# Patient Record
Sex: MALE | Race: BLACK OR AFRICAN AMERICAN | Employment: OTHER | URBAN - METROPOLITAN AREA
[De-identification: names, ages, dates, MRNs, and addresses within clinical notes are randomized per-mention and may not be internally consistent; named-entity substitution may affect disease eponyms.]

---

## 2019-04-06 ENCOUNTER — HOSPITAL ENCOUNTER (OUTPATIENT)
Age: 54
Discharge: HOME | End: 2019-04-06
Payer: MEDICAID

## 2019-04-06 LAB
ALBUMIN SERPL-MCNC: 4 G/DL (ref 3.5–5.2)
ALP BLD-CCNC: 69 U/L (ref 40–129)
ALT SERPL-CCNC: 24 U/L (ref 0–40)
ANION GAP SERPL CALCULATED.3IONS-SCNC: 9 MMOL/L (ref 7–16)
AST SERPL-CCNC: 17 U/L (ref 0–39)
BASOPHILS ABSOLUTE: 0.03 E9/L (ref 0–0.2)
BASOPHILS RELATIVE PERCENT: 0.5 % (ref 0–2)
BILIRUB SERPL-MCNC: 0.5 MG/DL (ref 0–1.2)
BILIRUBIN DIRECT: <0.2 MG/DL (ref 0–0.3)
BILIRUBIN, INDIRECT: NORMAL MG/DL (ref 0–1)
BUN BLDV-MCNC: 19 MG/DL (ref 6–20)
CALCIUM SERPL-MCNC: 8.9 MG/DL (ref 8.6–10.2)
CHLORIDE BLD-SCNC: 104 MMOL/L (ref 98–107)
CO2: 26 MMOL/L (ref 22–29)
CREAT SERPL-MCNC: 1 MG/DL (ref 0.7–1.2)
EOSINOPHILS ABSOLUTE: 0.06 E9/L (ref 0.05–0.5)
EOSINOPHILS RELATIVE PERCENT: 1.1 % (ref 0–6)
GFR AFRICAN AMERICAN: >60
GFR NON-AFRICAN AMERICAN: >60 ML/MIN/1.73
GLUCOSE BLD-MCNC: 145 MG/DL (ref 74–99)
HBA1C MFR BLD: 6.3 % (ref 4–5.6)
HCT VFR BLD CALC: 42 % (ref 37–54)
HEMOGLOBIN: 13.3 G/DL (ref 12.5–16.5)
IMMATURE GRANULOCYTES #: 0.01 E9/L
IMMATURE GRANULOCYTES %: 0.2 % (ref 0–5)
LYMPHOCYTES ABSOLUTE: 2.23 E9/L (ref 1.5–4)
LYMPHOCYTES RELATIVE PERCENT: 40.3 % (ref 20–42)
MCH RBC QN AUTO: 30.8 PG (ref 26–35)
MCHC RBC AUTO-ENTMCNC: 31.7 % (ref 32–34.5)
MCV RBC AUTO: 97.2 FL (ref 80–99.9)
MONOCYTES ABSOLUTE: 0.46 E9/L (ref 0.1–0.95)
MONOCYTES RELATIVE PERCENT: 8.3 % (ref 2–12)
NEUTROPHILS ABSOLUTE: 2.74 E9/L (ref 1.8–7.3)
NEUTROPHILS RELATIVE PERCENT: 49.6 % (ref 43–80)
PDW BLD-RTO: 13 FL (ref 11.5–15)
PLATELET # BLD: 281 E9/L (ref 130–450)
PMV BLD AUTO: 9.8 FL (ref 7–12)
POTASSIUM SERPL-SCNC: 4 MMOL/L (ref 3.5–5)
RBC # BLD: 4.32 E12/L (ref 3.8–5.8)
SODIUM BLD-SCNC: 139 MMOL/L (ref 132–146)
TOTAL PROTEIN: 7 G/DL (ref 6.4–8.3)
WBC # BLD: 5.5 E9/L (ref 4.5–11.5)

## 2019-04-06 PROCEDURE — 83036 HEMOGLOBIN GLYCOSYLATED A1C: CPT

## 2019-04-06 PROCEDURE — 80076 HEPATIC FUNCTION PANEL: CPT

## 2019-04-06 PROCEDURE — 36415 COLL VENOUS BLD VENIPUNCTURE: CPT

## 2019-04-06 PROCEDURE — 80048 BASIC METABOLIC PNL TOTAL CA: CPT

## 2019-04-06 PROCEDURE — 85025 COMPLETE CBC W/AUTO DIFF WBC: CPT

## 2020-11-23 ENCOUNTER — APPOINTMENT (OUTPATIENT)
Dept: CT IMAGING | Age: 55
End: 2020-11-23
Payer: COMMERCIAL

## 2020-11-23 ENCOUNTER — HOSPITAL ENCOUNTER (EMERGENCY)
Age: 55
Discharge: HOME | End: 2020-11-23
Attending: EMERGENCY MEDICINE
Payer: COMMERCIAL

## 2020-11-23 VITALS
TEMPERATURE: 98.3 F | SYSTOLIC BLOOD PRESSURE: 176 MMHG | DIASTOLIC BLOOD PRESSURE: 90 MMHG | WEIGHT: 188.7 LBS | HEART RATE: 80 BPM | OXYGEN SATURATION: 98 % | RESPIRATION RATE: 18 BRPM

## 2020-11-23 LAB
ALBUMIN SERPL-MCNC: 3.5 G/DL (ref 3.5–5.2)
ALP BLD-CCNC: 82 U/L (ref 40–129)
ALT SERPL-CCNC: 17 U/L (ref 0–40)
ANION GAP SERPL CALCULATED.3IONS-SCNC: 10 MMOL/L (ref 7–16)
AST SERPL-CCNC: 16 U/L (ref 0–39)
BASOPHILS ABSOLUTE: 0.03 E9/L (ref 0–0.2)
BASOPHILS RELATIVE PERCENT: 0.4 % (ref 0–2)
BILIRUB SERPL-MCNC: 0.3 MG/DL (ref 0–1.2)
BUN BLDV-MCNC: 19 MG/DL (ref 6–20)
CALCIUM SERPL-MCNC: 9.3 MG/DL (ref 8.6–10.2)
CHLORIDE BLD-SCNC: 105 MMOL/L (ref 98–107)
CO2: 25 MMOL/L (ref 22–29)
CREAT SERPL-MCNC: 1.1 MG/DL (ref 0.7–1.2)
EOSINOPHILS ABSOLUTE: 0.07 E9/L (ref 0.05–0.5)
EOSINOPHILS RELATIVE PERCENT: 0.8 % (ref 0–6)
GFR AFRICAN AMERICAN: >60
GFR NON-AFRICAN AMERICAN: >60 ML/MIN/1.73
GLUCOSE BLD-MCNC: 145 MG/DL (ref 74–99)
HCT VFR BLD CALC: 41.1 % (ref 37–54)
HEMOGLOBIN: 12.8 G/DL (ref 12.5–16.5)
IMMATURE GRANULOCYTES #: 0.02 E9/L
IMMATURE GRANULOCYTES %: 0.2 % (ref 0–5)
LIPASE: 20 U/L (ref 13–60)
LYMPHOCYTES ABSOLUTE: 2.1 E9/L (ref 1.5–4)
LYMPHOCYTES RELATIVE PERCENT: 25 % (ref 20–42)
MCH RBC QN AUTO: 30.3 PG (ref 26–35)
MCHC RBC AUTO-ENTMCNC: 31.1 % (ref 32–34.5)
MCV RBC AUTO: 97.2 FL (ref 80–99.9)
MONOCYTES ABSOLUTE: 0.82 E9/L (ref 0.1–0.95)
MONOCYTES RELATIVE PERCENT: 9.8 % (ref 2–12)
NEUTROPHILS ABSOLUTE: 5.37 E9/L (ref 1.8–7.3)
NEUTROPHILS RELATIVE PERCENT: 63.8 % (ref 43–80)
PDW BLD-RTO: 12.3 FL (ref 11.5–15)
PLATELET # BLD: 342 E9/L (ref 130–450)
PMV BLD AUTO: 9.2 FL (ref 7–12)
POTASSIUM REFLEX MAGNESIUM: 3.6 MMOL/L (ref 3.5–5)
RBC # BLD: 4.23 E12/L (ref 3.8–5.8)
SODIUM BLD-SCNC: 140 MMOL/L (ref 132–146)
TOTAL PROTEIN: 7.8 G/DL (ref 6.4–8.3)
WBC # BLD: 8.4 E9/L (ref 4.5–11.5)

## 2020-11-23 PROCEDURE — 6360000004 HC RX CONTRAST MEDICATION: Performed by: RADIOLOGY

## 2020-11-23 PROCEDURE — 2580000003 HC RX 258: Performed by: RADIOLOGY

## 2020-11-23 PROCEDURE — 96374 THER/PROPH/DIAG INJ IV PUSH: CPT

## 2020-11-23 PROCEDURE — 6360000002 HC RX W HCPCS: Performed by: EMERGENCY MEDICINE

## 2020-11-23 PROCEDURE — 85025 COMPLETE CBC W/AUTO DIFF WBC: CPT

## 2020-11-23 PROCEDURE — 99283 EMERGENCY DEPT VISIT LOW MDM: CPT

## 2020-11-23 PROCEDURE — 83690 ASSAY OF LIPASE: CPT

## 2020-11-23 PROCEDURE — 2580000003 HC RX 258: Performed by: EMERGENCY MEDICINE

## 2020-11-23 PROCEDURE — 74177 CT ABD & PELVIS W/CONTRAST: CPT

## 2020-11-23 PROCEDURE — 80053 COMPREHEN METABOLIC PANEL: CPT

## 2020-11-23 RX ORDER — SODIUM CHLORIDE 0.9 % (FLUSH) 0.9 %
10 SYRINGE (ML) INJECTION PRN
Status: COMPLETED | OUTPATIENT
Start: 2020-11-23 | End: 2020-11-23

## 2020-11-23 RX ORDER — ONDANSETRON 4 MG/1
4 TABLET, ORALLY DISINTEGRATING ORAL EVERY 8 HOURS PRN
Qty: 24 TABLET | Refills: 0 | Status: SHIPPED | OUTPATIENT
Start: 2020-11-23

## 2020-11-23 RX ORDER — OMEPRAZOLE 20 MG/1
20 CAPSULE, DELAYED RELEASE ORAL EVERY 12 HOURS
Qty: 28 CAPSULE | Refills: 0 | Status: SHIPPED | OUTPATIENT
Start: 2020-11-23 | End: 2020-12-07

## 2020-11-23 RX ORDER — KETOROLAC TROMETHAMINE 30 MG/ML
15 INJECTION, SOLUTION INTRAMUSCULAR; INTRAVENOUS ONCE
Status: COMPLETED | OUTPATIENT
Start: 2020-11-23 | End: 2020-11-23

## 2020-11-23 RX ORDER — DICYCLOMINE HYDROCHLORIDE 10 MG/1
20 CAPSULE ORAL 3 TIMES DAILY PRN
Qty: 20 CAPSULE | Refills: 0 | Status: SHIPPED | OUTPATIENT
Start: 2020-11-23

## 2020-11-23 RX ORDER — 0.9 % SODIUM CHLORIDE 0.9 %
1000 INTRAVENOUS SOLUTION INTRAVENOUS ONCE
Status: COMPLETED | OUTPATIENT
Start: 2020-11-23 | End: 2020-11-23

## 2020-11-23 RX ADMIN — KETOROLAC TROMETHAMINE 15 MG: 30 INJECTION, SOLUTION INTRAMUSCULAR at 10:16

## 2020-11-23 RX ADMIN — Medication 10 ML: at 11:27

## 2020-11-23 RX ADMIN — IOPAMIDOL 75 ML: 755 INJECTION, SOLUTION INTRAVENOUS at 11:30

## 2020-11-23 RX ADMIN — SODIUM CHLORIDE 1000 ML: 9 INJECTION, SOLUTION INTRAVENOUS at 10:17

## 2020-11-23 ASSESSMENT — PAIN SCALES - GENERAL
PAINLEVEL_OUTOF10: 8
PAINLEVEL_OUTOF10: 8

## 2020-11-23 ASSESSMENT — PAIN DESCRIPTION - LOCATION: LOCATION: ABDOMEN

## 2020-11-23 ASSESSMENT — ENCOUNTER SYMPTOMS
CHEST TIGHTNESS: 0
CONSTIPATION: 0
BLOOD IN STOOL: 0
EYE PAIN: 0
COUGH: 0
BACK PAIN: 0
ABDOMINAL PAIN: 1
SHORTNESS OF BREATH: 0
SINUS PRESSURE: 0
EYE REDNESS: 0
PHOTOPHOBIA: 0
SINUS PAIN: 0

## 2020-11-23 ASSESSMENT — PAIN DESCRIPTION - ORIENTATION: ORIENTATION: RIGHT

## 2020-11-23 NOTE — ED PROVIDER NOTES
Kirk Anderson is a 50 y.o. male presenting to the ED for rlq pain, beginning 1 week ago. The complaint has been persistent, moderate in severity, and worsened by nothing. 49 yo m who notes rlq pain since last week. Pt notes it is intermittent 5/10 and had radiated intop his grroin. He denies any urinary sx, hematuria, dysuria, fever or chills, flank pain, diarrhea, cough or cold sx, vomiting. Review of Systems:   Review of Systems   Constitutional: Negative for appetite change, diaphoresis and fever. HENT: Negative for congestion, sinus pressure and sinus pain. Eyes: Negative for photophobia, pain and redness. Respiratory: Negative for cough, chest tightness and shortness of breath. Cardiovascular: Negative for chest pain and leg swelling. Gastrointestinal: Positive for abdominal pain. Negative for blood in stool and constipation. Endocrine: Negative for polyphagia and polyuria. Genitourinary: Negative for dysuria, enuresis and flank pain. Musculoskeletal: Negative for arthralgias and back pain. Allergic/Immunologic: Negative for food allergies and immunocompromised state. Neurological: Negative for dizziness and headaches. Hematological: Negative for adenopathy. Does not bruise/bleed easily. Psychiatric/Behavioral: Negative for agitation and behavioral problems. --------------------------------------------- PAST HISTORY ---------------------------------------------  Past Medical History:  has no past medical history on file. Past Surgical History:  has no past surgical history on file. Social History:  reports that he has been smoking. He has been smoking about 0.50 packs per day. He has never used smokeless tobacco. He reports current drug use. Drug: Marijuana. Family History: family history is not on file. The patients home medications have been reviewed.     Allergies: Penicillins    -------------------------------------------------- RESULTS -------------------------------------------------  All laboratory and radiology results have been personally reviewed by myself   LABS:  Results for orders placed or performed during the hospital encounter of 11/23/20   CBC Auto Differential   Result Value Ref Range    WBC 8.4 4.5 - 11.5 E9/L    RBC 4.23 3.80 - 5.80 E12/L    Hemoglobin 12.8 12.5 - 16.5 g/dL    Hematocrit 41.1 37.0 - 54.0 %    MCV 97.2 80.0 - 99.9 fL    MCH 30.3 26.0 - 35.0 pg    MCHC 31.1 (L) 32.0 - 34.5 %    RDW 12.3 11.5 - 15.0 fL    Platelets 171 821 - 531 E9/L    MPV 9.2 7.0 - 12.0 fL    Neutrophils % 63.8 43.0 - 80.0 %    Immature Granulocytes % 0.2 0.0 - 5.0 %    Lymphocytes % 25.0 20.0 - 42.0 %    Monocytes % 9.8 2.0 - 12.0 %    Eosinophils % 0.8 0.0 - 6.0 %    Basophils % 0.4 0.0 - 2.0 %    Neutrophils Absolute 5.37 1.80 - 7.30 E9/L    Immature Granulocytes # 0.02 E9/L    Lymphocytes Absolute 2.10 1.50 - 4.00 E9/L    Monocytes Absolute 0.82 0.10 - 0.95 E9/L    Eosinophils Absolute 0.07 0.05 - 0.50 E9/L    Basophils Absolute 0.03 0.00 - 0.20 E9/L   Comprehensive Metabolic Panel w/ Reflex to MG   Result Value Ref Range    Sodium 140 132 - 146 mmol/L    Potassium reflex Magnesium 3.6 3.5 - 5.0 mmol/L    Chloride 105 98 - 107 mmol/L    CO2 25 22 - 29 mmol/L    Anion Gap 10 7 - 16 mmol/L    Glucose 145 (H) 74 - 99 mg/dL    BUN 19 6 - 20 mg/dL    CREATININE 1.1 0.7 - 1.2 mg/dL    GFR Non-African American >60 >=60 mL/min/1.73    GFR African American >60     Calcium 9.3 8.6 - 10.2 mg/dL    Total Protein 7.8 6.4 - 8.3 g/dL    Alb 3.5 3.5 - 5.2 g/dL    Total Bilirubin 0.3 0.0 - 1.2 mg/dL    Alkaline Phosphatase 82 40 - 129 U/L    ALT 17 0 - 40 U/L    AST 16 0 - 39 U/L   Lipase   Result Value Ref Range    Lipase 20 13 - 60 U/L       RADIOLOGY:  Interpreted by Radiologist.  CT ABDOMEN PELVIS W IV CONTRAST Additional Contrast? None   Final Result   1. There is no acute intra-abdominal or intrapelvic pathology.   Specifically,   there is no appendicitis, dry.      Capillary Refill: Capillary refill takes less than 2 seconds. Neurological:      General: No focal deficit present. Mental Status: He is alert and oriented to person, place, and time. Psychiatric:         Mood and Affect: Mood normal.             ------------------------------ ED COURSE/MEDICAL DECISION MAKING----------------------  Medications   0.9 % sodium chloride bolus (0 mLs Intravenous Stopped 11/23/20 1214)   ketorolac (TORADOL) injection 15 mg (15 mg Intravenous Given 11/23/20 1016)   iopamidol (ISOVUE-370) 76 % injection 75 mL (75 mLs Intravenous Given 11/23/20 1130)   sodium chloride flush 0.9 % injection 10 mL (10 mLs Intravenous Given 11/23/20 1127)         ED COURSE:       Medical Decision Making: All testiugn reviewed with patient, possible MSK cause of patientws pain, no acute intra-abdominal pathology, appendix looks normal, no hydronephrosis or evidence of kidney stone. No signs of diverticulitis. Pt to be discharged and f/w pcp      I have reviewed my findings and recommendations with Isabel Londono and members of family present at the time of disposition. My findings/plan: The primary encounter diagnosis was Right lower quadrant abdominal pain. A diagnosis of Periumbilical hernia was also pertinent to this visit. New Prescriptions    DICYCLOMINE (BENTYL) 10 MG CAPSULE    Take 2 capsules by mouth 3 times daily as needed (pain)    OMEPRAZOLE (PRILOSEC) 20 MG DELAYED RELEASE CAPSULE    Take 1 capsule by mouth every 12 hours for 14 days    ONDANSETRON (ZOFRAN ODT) 4 MG DISINTEGRATING TABLET    Take 1 tablet by mouth every 8 hours as needed for Nausea or Vomiting         Risks and benefits were discussed with patient for All medications dispensed and given in department as well prescriptions prescribed for home, . The patient elected to take the medicine. Pt instructed on warning signs and precautions for medication side effects.  The patient was given warning signs for when to seek medical attention. Counseled regarding todays diagnosis, including possible risks and complications,  especially if left uncontrolled. Counseled regarding the possible side effects, risks, benefits and alternatives to treatment; patient and/or guardian verbalizes understanding, agrees, feels comfortable with and wishes to proceed with treatment plan. Advised patient to call his primary care physician with any new medication issues, and read all Rx info from pharmacy to assure aware of all possible risks and side effects of medication before taking. I did discuss warning signs for when to return to the Emergency Room, and the patient verbalized understanding      Counseling: The emergency provider has spoken with the patient and discussed todays results, in addition to providing specific details for the plan of care and counseling regarding the diagnosis and prognosis. Questions are answered at this time and they are agreeable with the plan.      --------------------------------- IMPRESSION AND DISPOSITION ---------------------------------    IMPRESSION  1. Right lower quadrant abdominal pain    2. Periumbilical hernia        DISPOSITION  Disposition: Discharge to home  Patient condition is good      NOTE: This report was transcribed using voice recognition software.  Every effort was made to ensure accuracy; however, inadvertent computerized transcription errors may be present       Fer Song DO  11/23/20 0514

## 2021-07-15 ENCOUNTER — HOSPITAL ENCOUNTER (EMERGENCY)
Age: 56
Discharge: HOME | End: 2021-07-15
Payer: COMMERCIAL

## 2021-07-15 VITALS
RESPIRATION RATE: 16 BRPM | HEART RATE: 103 BPM | SYSTOLIC BLOOD PRESSURE: 152 MMHG | OXYGEN SATURATION: 98 % | DIASTOLIC BLOOD PRESSURE: 84 MMHG | HEIGHT: 65 IN | BODY MASS INDEX: 30.82 KG/M2 | WEIGHT: 185 LBS | TEMPERATURE: 97.8 F

## 2021-07-15 DIAGNOSIS — T63.441A BEE STING, ACCIDENTAL OR UNINTENTIONAL, INITIAL ENCOUNTER: Primary | ICD-10-CM

## 2021-07-15 PROCEDURE — 99282 EMERGENCY DEPT VISIT SF MDM: CPT

## 2021-07-15 PROCEDURE — 6370000000 HC RX 637 (ALT 250 FOR IP): Performed by: NURSE PRACTITIONER

## 2021-07-15 RX ORDER — DIPHENHYDRAMINE HCL 25 MG
25 TABLET ORAL ONCE
Status: COMPLETED | OUTPATIENT
Start: 2021-07-15 | End: 2021-07-15

## 2021-07-15 RX ORDER — DIPHENHYDRAMINE HCL 25 MG
25 CAPSULE ORAL EVERY 6 HOURS PRN
Qty: 10 CAPSULE | Refills: 0 | Status: SHIPPED | OUTPATIENT
Start: 2021-07-15 | End: 2021-07-25

## 2021-07-15 RX ADMIN — DIPHENHYDRAMINE HCL 25 MG: 25 TABLET ORAL at 17:02

## 2021-07-15 ASSESSMENT — PAIN DESCRIPTION - LOCATION: LOCATION: LEG

## 2021-07-15 ASSESSMENT — PAIN DESCRIPTION - FREQUENCY: FREQUENCY: CONTINUOUS

## 2021-07-15 ASSESSMENT — PAIN DESCRIPTION - DESCRIPTORS: DESCRIPTORS: BURNING;TINGLING

## 2021-07-15 ASSESSMENT — PAIN DESCRIPTION - ORIENTATION: ORIENTATION: LEFT;RIGHT

## 2021-07-15 ASSESSMENT — PAIN DESCRIPTION - PROGRESSION: CLINICAL_PROGRESSION: GRADUALLY WORSENING

## 2021-07-15 ASSESSMENT — PAIN SCALES - GENERAL: PAINLEVEL_OUTOF10: 8

## 2021-07-15 ASSESSMENT — PAIN DESCRIPTION - PAIN TYPE: TYPE: ACUTE PAIN

## 2021-07-15 ASSESSMENT — PAIN DESCRIPTION - ONSET: ONSET: GRADUAL

## 2021-07-15 NOTE — ED PROVIDER NOTES
[address]  Department of Emergency Medicine   ED  Encounter Note  Admit Date/RoomTime: 7/15/2021  4:14 PM  ED Room: /  NAME: Alvaro Bailon  : 1965  MRN: [id number]     Chief Complaint:  Insect Bite (bee stings, no allergy )    HISTORY OF PRESENT ILLNESS        Alvaro Bailon is a 49 y.o. male who presents to the ED by private vehicle for multiple bee stings to bilateral lower legs and top of his head while doing lawn work at 11 AM.  Patient denies any previous history. He states that he had bee sting 3 weeks ago and he did apply topical itching medicine and it went away and had no respiratory involvement. He denies any welts or any abrasions. Patient denies any shortness of breath, wheezing, abdominal pain, nausea, vomiting or any hives. The complaint has been stable and nearly resolved and are mild in severity. ROS   Pertinent positives and negatives are stated within HPI, all other systems reviewed and are negative. Past Medical History:  has no past medical history on file. Surgical History:  has no past surgical history on file. Social History:  reports that he has been smoking. He has been smoking about 0.50 packs per day. He has never used smokeless tobacco. He reports current drug use. Drug: Marijuana. Family History: family history is not on file. Allergies: Penicillins    PHYSICAL EXAM   Oxygen Saturation Interpretation: Normal.        ED Triage Vitals [07/15/21 1613]   BP Temp Temp src Pulse Resp SpO2 Height Weight   (!) 152/84 97.8 °F (36.6 °C) -- 103 16 98 % 5' 5\" (1.651 m) 185 lb (83.9 kg)         Physical Exam  Constitutional/General: Alert and oriented x3, well appearing, non toxic  HEENT:  NC/NT. PERRLA,  Airway patent. Neck: Supple, full ROM, non tender to palpation in the midline, no stridor, no crepitus, no meningeal signs  Respiratory: Lungs clear to auscultation bilaterally, no wheezes, rales, or rhonchi.  Not in respiratory distress  CV:  Regular rate. Regular rhythm. No murmurs, gallops, or rubs. 2+ distal pulses  Chest: No chest wall tenderness  GI:  Abdomen Soft, Non tender, Non distended. +BS. No rebound, guarding, or rigidity. No pulsatile masses. Musculoskeletal: Moves all extremities x 4. Warm and well perfused, no clubbing, cyanosis, or edema. Capillary refill <3 seconds  Integument: skin warm and dry. No rashes. Lymphatic: no lymphadenopathy noted  Neurologic: GCS 15, no focal deficits, symmetric strength 5/5 in the upper and lower extremities bilaterally  Psychiatric: Normal Affect    Lab / Imaging Results   (All laboratory and radiology results have been personally reviewed by myself)  Labs:  No results found for this visit on 07/15/21. Imaging: All Radiology results interpreted by Radiologist unless otherwise noted. No orders to display       ED Course / Medical Decision Making     Medications   diphenhydrAMINE (BENADRYL) tablet 25 mg (25 mg Oral Given 7/15/21 1702)        Consult(s):   None    Procedure(s):   none    MDM:   This is a 68-year-old gentleman who arrived today several hours after being stung multiple times and denies any respiratory distress, wheezing or difficulty breathing. I could not identify any welts or erythema to the sites of stings. The patient does not appear to be in any acute distress. Patient will be given Benadryl and instructed on signs and symptoms warranting immediate return for reevaluation at any time. Patient had no hives no symptoms of allergy and had mild stinging in lower extremities but I could not find any puncture wounds or stingers located in the skin. 2+ pedal and posterior tibial pulses intact compartments are all soft and compressible in the calves. No welts or lesions noted on scalp. Patient advised on follow-up with PCP.     Plan of Care/Counseling:  Victor Baumgarten, APRN - CNP reviewed today's visit with the patient in addition to providing specific details for the plan of care and counseling regarding the diagnosis and prognosis. Questions are answered at this time and are agreeable with the plan. ASSESSMENT     1. Bee sting, accidental or unintentional, initial encounter      PLAN   Discharged home. Patient condition is good    New Medications     Discharge Medication List as of 7/15/2021  4:54 PM      START taking these medications    Details   diphenhydrAMINE (BENADRYL ALLERGY) 25 MG capsule Take 1 capsule by mouth every 6 hours as needed for Itching, Disp-10 capsule, R-0Print           Electronically signed by ANKUSH Fields CNP   DD: 7/15/21  **This report was transcribed using voice recognition software. Every effort was made to ensure accuracy; however, inadvertent computerized transcription errors may be present.   END OF ED PROVIDER NOTE            ANKUSH Fields CNP  07/15/21 7898

## 2021-12-20 ENCOUNTER — HOSPITAL ENCOUNTER (EMERGENCY)
Age: 56
Discharge: HOME | End: 2021-12-20
Payer: COMMERCIAL

## 2021-12-20 VITALS
BODY MASS INDEX: 30.32 KG/M2 | WEIGHT: 182 LBS | SYSTOLIC BLOOD PRESSURE: 168 MMHG | DIASTOLIC BLOOD PRESSURE: 93 MMHG | OXYGEN SATURATION: 98 % | HEART RATE: 78 BPM | TEMPERATURE: 96.6 F | RESPIRATION RATE: 14 BRPM | HEIGHT: 65 IN

## 2021-12-20 DIAGNOSIS — H61.23 BILATERAL IMPACTED CERUMEN: Primary | ICD-10-CM

## 2021-12-20 DIAGNOSIS — J01.00 ACUTE MAXILLARY SINUSITIS, RECURRENCE NOT SPECIFIED: ICD-10-CM

## 2021-12-20 PROCEDURE — 6370000000 HC RX 637 (ALT 250 FOR IP): Performed by: PHYSICIAN ASSISTANT

## 2021-12-20 PROCEDURE — 99283 EMERGENCY DEPT VISIT LOW MDM: CPT

## 2021-12-20 RX ORDER — DOXYCYCLINE HYCLATE 100 MG
100 TABLET ORAL 2 TIMES DAILY
Qty: 20 TABLET | Refills: 0 | Status: SHIPPED | OUTPATIENT
Start: 2021-12-20 | End: 2021-12-30

## 2021-12-20 RX ADMIN — Medication 5 DROP: at 15:27

## 2021-12-20 ASSESSMENT — PAIN DESCRIPTION - ORIENTATION: ORIENTATION: RIGHT

## 2021-12-20 ASSESSMENT — PAIN DESCRIPTION - LOCATION: LOCATION: EAR

## 2021-12-20 ASSESSMENT — PAIN SCALES - GENERAL: PAINLEVEL_OUTOF10: 10

## 2021-12-20 ASSESSMENT — PAIN SCALES - WONG BAKER: WONGBAKER_NUMERICALRESPONSE: 2

## 2021-12-20 ASSESSMENT — PAIN DESCRIPTION - DESCRIPTORS: DESCRIPTORS: TINGLING

## 2021-12-20 ASSESSMENT — PAIN DESCRIPTION - PAIN TYPE: TYPE: ACUTE PAIN

## 2021-12-20 NOTE — ED PROVIDER NOTES
[address] [place] [address]  Department of Emergency Medicine   ED  Encounter Note  Admit Date/RoomTime: 2021  2:38 PM  ED Room: 37/37    NAME: Mi Cerrato  : 1965  MRN: [id number]     Chief Complaint:  Otalgia (right ear, radiates up to his head)    History of Present Illness        Mi Cerrato is a 49 y.o. old male who presenting to the emergency department with complaint of gradual onset right ear pain and plugged sensation in the right ear. Symptoms began a few weeks ago and have been worsening since that time. Patient states he is also had sinus pressure x3 weeks. Patient denies chills, dyspnea, eye irritation, fever, headache, myalgias, nasal congestion, nonproductive cough, productive cough, rhinorrhea, sneezing, sore throat, sweats, tooth pain, weight loss or wheezing. His symptoms are relieved by nothing. He has recent history of nothing pertinent as it pertains to today's visit. Patient denies any recent upper respiratory illness. Patient denies any trauma to his ear. Patient denies any recent flying, barotrauma. Patient states \"it even hurts when I put my hat on on the right side. \"    ROS   Pertinent positives and negatives are stated within HPI, all other systems reviewed and are negative. Past Medical History:  has no past medical history on file. Surgical History:  has no past surgical history on file. Social History:  reports that he has been smoking. He has been smoking about 0.50 packs per day. He has never used smokeless tobacco. He reports current drug use. Drug: Marijuana Jonathon Juan). Family History: family history is not on file.      Allergies: Penicillins    Physical Exam   Oxygen Saturation Interpretation: Normal.        ED Triage Vitals   BP Temp Temp Source Pulse Resp SpO2 Height Weight   21 1440 21 1439 21 1439 21 1439 21 1439 21 1439 21 1439 21 1439   (!) 168/93 96.6 °F (35.9 °C) Temporal 78 14 98 % 5' 5\" (1.651 m) 182 lb (82.6 kg)         Constitutional:  Alert, development consistent with age. Ears:  External Ears: Bilateral normal.                 TM's & External Canals:  abnormal TM right ear - could not visualize TM, abnormal TM left ear - could not visualize TM. Bilaterally too much earwax  Nose:   There is no abnormalities present. ttp of b/l maxillary sinus. Throat:  Pharynx without injection, exudate, or tonsillar hypertrophy. Airway patient. Neck:  Normal ROM. Supple. Respiratory:  Clear to auscultation and breath sounds equal.    CV: Regular rate and rhythm, normal heart sounds, without pathological murmurs, ectopy, gallops, or rubs. Skin:  No rashes, erythema present, unless noted elsewhere. Lymphatic: No lymphangitis or adenopathy noted. Neurological:  Oriented. Motor functions intact. Lab / Imaging Results   (All laboratory and radiology results have been personally reviewed by myself)  Labs:  No results found for this visit on 12/20/21. Imaging: All Radiology results interpreted by Radiologist unless otherwise noted. No orders to display     ED Course / Medical Decision Making     Medications   carbamide peroxide (DEBROX) 6.5 % otic solution 5 drop (5 drops Both Ears Given 12/20/21 1527)        Consult(s):   None    Procedure(s):   Bilateral irrigation of the ear canal with warm water and peroxide completed and Debrox    MDM: Patient presenting with ear pain and sinus pressure. Patient is in no acute distress, afebrile, nontoxic appearance. Patient's TMs bilaterally were unable to be visualized due to the amount of wax. Patient's ears were irrigated bilaterally with warm water and peroxide and then Debrox. Wax improved bilaterally but still had a significant amount. Patient be sent with Debrox drops to use to help soften more wax. Patient felt better after the irrigation. Patient was started on doxycycline for sinusitis. Recommend patient follow-up with PCP and ENT. Recommend patient return to the ED with new or worsening of symptoms. Plan of Care/Counseling:  Esdras De Leon PA-C reviewed today's visit with the patient in addition to providing specific details for the plan of care and counseling regarding the diagnosis and prognosis. Questions are answered at this time and are agreeable with the plan. Assessment      1. Bilateral impacted cerumen    2. Acute maxillary sinusitis, recurrence not specified      Plan   Discharged home. Patient condition is stable    New Medications     New Prescriptions    CARBAMIDE PEROXIDE (DEBROX) 6.5 % OTIC SOLUTION    Place 5 drops into both ears 2 times daily    DOXYCYCLINE HYCLATE (VIBRA-TABS) 100 MG TABLET    Take 1 tablet by mouth 2 times daily for 10 days     Electronically signed by Author Afsaneh PA-C   DD: 12/20/21  **This report was transcribed using voice recognition software. Every effort was made to ensure accuracy; however, inadvertent computerized transcription errors may be present.   END OF ED PROVIDER NOTE        Author Afsaneh PA-C  12/20/21 7639

## 2022-02-11 ENCOUNTER — OFFICE VISIT (OUTPATIENT)
Dept: ENT CLINIC | Age: 57
End: 2022-02-11
Payer: COMMERCIAL

## 2022-02-11 VITALS
HEIGHT: 65 IN | OXYGEN SATURATION: 99 % | BODY MASS INDEX: 30.82 KG/M2 | DIASTOLIC BLOOD PRESSURE: 90 MMHG | TEMPERATURE: 97.8 F | HEART RATE: 85 BPM | SYSTOLIC BLOOD PRESSURE: 152 MMHG | WEIGHT: 185 LBS

## 2022-02-11 DIAGNOSIS — H61.23 BILATERAL IMPACTED CERUMEN: Primary | ICD-10-CM

## 2022-02-11 PROCEDURE — 3017F COLORECTAL CA SCREEN DOC REV: CPT | Performed by: OTOLARYNGOLOGY

## 2022-02-11 PROCEDURE — G8427 DOCREV CUR MEDS BY ELIG CLIN: HCPCS | Performed by: OTOLARYNGOLOGY

## 2022-02-11 PROCEDURE — 99204 OFFICE O/P NEW MOD 45 MIN: CPT | Performed by: OTOLARYNGOLOGY

## 2022-02-11 PROCEDURE — 69210 REMOVE IMPACTED EAR WAX UNI: CPT | Performed by: OTOLARYNGOLOGY

## 2022-02-11 PROCEDURE — G8484 FLU IMMUNIZE NO ADMIN: HCPCS | Performed by: OTOLARYNGOLOGY

## 2022-02-11 PROCEDURE — G8417 CALC BMI ABV UP PARAM F/U: HCPCS | Performed by: OTOLARYNGOLOGY

## 2022-02-11 PROCEDURE — 4004F PT TOBACCO SCREEN RCVD TLK: CPT | Performed by: OTOLARYNGOLOGY

## 2022-02-11 NOTE — PROGRESS NOTES
Subjective:      Patient ID:  Adeola Church is a 49 y.o. male. HPI:    Patient presents today for cerumen impaction and right ear pain for which he was seen in the ED recently. Condition has been present for 1 month(s). Patient states they cleaned his ear out in the ED and sent him home with debrox drops and antibiotics due to suspicion of acute sinusitis. He denies facial pain/pressure or foul drainage. States his right ear pain has improved. Denies hearing loss, tinnitus or room spinning vertigo. Patient's medications, allergies, past medical, surgical, social and family histories were reviewed and updated as appropriate. Review of Systems   Constitutional: Negative. Negative for appetite change, chills and fever. HENT: Negative for ear discharge, ear pain and hearing loss. Eyes: Negative. Negative for pain, discharge and visual disturbance. Respiratory: Negative. Negative for apnea, chest tightness and shortness of breath. Cardiovascular: Negative. Negative for chest pain, palpitations and leg swelling. Gastrointestinal: Negative. Negative for abdominal pain, diarrhea and vomiting. Endocrine: Negative for cold intolerance, heat intolerance and polydipsia. Genitourinary: Negative. Negative for dysuria, flank pain and hematuria. Musculoskeletal: Negative. Negative for arthralgias, gait problem and neck pain. Skin: Negative. Negative for color change, pallor and rash. Allergic/Immunologic: Negative for environmental allergies, food allergies and immunocompromised state. Neurological: Negative. Negative for dizziness, numbness and headaches. Hematological: Negative for adenopathy. Psychiatric/Behavioral: Negative. Negative for behavioral problems and hallucinations. All other systems reviewed and are negative. Objective:   Physical Exam  Constitutional:       General: He is not in acute distress. Appearance: Normal appearance.    HENT:      Head: Normocephalic and atraumatic. Right Ear: Tympanic membrane normal. There is impacted cerumen. Left Ear: Tympanic membrane normal. There is impacted cerumen. Nose: Congestion present. Mouth/Throat:      Mouth: Mucous membranes are moist.      Pharynx: No oropharyngeal exudate. Eyes:      Extraocular Movements: Extraocular movements intact. Pupils: Pupils are equal, round, and reactive to light. Cardiovascular:      Rate and Rhythm: Normal rate. Pulmonary:      Effort: Pulmonary effort is normal.   Musculoskeletal:         General: Normal range of motion. Cervical back: Normal range of motion and neck supple. Skin:     General: Skin is warm and dry. Neurological:      General: No focal deficit present. Mental Status: He is alert and oriented to person, place, and time. Psychiatric:         Mood and Affect: Mood normal.         Behavior: Behavior normal.       Cerumen removal      Auditory canal(s) both ears completely obstructed with cerumen. A microscope was not used . Cerumen was gently removed using soft plastic curette, suction. Tympanic membranes are intact following the procedure. Auditory canals appear normal.                  Assessment:       Diagnosis Orders   1. Bilateral impacted cerumen  TX REMOVAL IMPACTED CERUMEN INSTRUMENTATION UNILAT              Plan:      - Cerumen impaction removed in the office today. Discussed weekly irrigations with H2O2 or debrox drops. Follow up 6 months for cerumen recheck    Electronically signed by Daryl Palacios DO on 2/11/2022 at 11:06 AM                           [facility]  1965    I have discussed the case, including pertinent history and exam findings with the resident. I have seen and examined the patient and the key elements of the encounter have been performed by me. I agree with the assessment, plan and orders as documented by the  resident              Remainder of medical problems as per  resident note. Patient seen and examined. Agree with above exam, assessment and plan.       Electronically signed by Dmitri Weaver DO on 2/21/22 at 11:00 AM EST

## 2022-02-21 ASSESSMENT — ENCOUNTER SYMPTOMS
RESPIRATORY NEGATIVE: 1
GASTROINTESTINAL NEGATIVE: 1
CHEST TIGHTNESS: 0
VOMITING: 0
EYE DISCHARGE: 0
ABDOMINAL PAIN: 0
DIARRHEA: 0
COLOR CHANGE: 0
EYE PAIN: 0
APNEA: 0
EYES NEGATIVE: 1
SHORTNESS OF BREATH: 0

## 2022-08-07 ENCOUNTER — HOSPITAL ENCOUNTER (EMERGENCY)
Age: 57
Discharge: HOME | End: 2022-08-07
Payer: COMMERCIAL

## 2022-08-07 VITALS
HEART RATE: 88 BPM | TEMPERATURE: 97.5 F | SYSTOLIC BLOOD PRESSURE: 184 MMHG | RESPIRATION RATE: 14 BRPM | HEIGHT: 65 IN | BODY MASS INDEX: 29.99 KG/M2 | DIASTOLIC BLOOD PRESSURE: 85 MMHG | OXYGEN SATURATION: 99 % | WEIGHT: 180 LBS

## 2022-08-07 DIAGNOSIS — L25.5 PLANT DERMATITIS: Primary | ICD-10-CM

## 2022-08-07 PROCEDURE — 6370000000 HC RX 637 (ALT 250 FOR IP): Performed by: PHYSICIAN ASSISTANT

## 2022-08-07 PROCEDURE — 6360000002 HC RX W HCPCS: Performed by: PHYSICIAN ASSISTANT

## 2022-08-07 PROCEDURE — 99284 EMERGENCY DEPT VISIT MOD MDM: CPT

## 2022-08-07 PROCEDURE — 96372 THER/PROPH/DIAG INJ SC/IM: CPT

## 2022-08-07 RX ORDER — DEXAMETHASONE SODIUM PHOSPHATE 10 MG/ML
10 INJECTION INTRAMUSCULAR; INTRAVENOUS ONCE
Status: COMPLETED | OUTPATIENT
Start: 2022-08-07 | End: 2022-08-07

## 2022-08-07 RX ORDER — DIPHENHYDRAMINE HCL 25 MG
25 CAPSULE ORAL EVERY 6 HOURS PRN
Qty: 25 CAPSULE | Refills: 0 | Status: SHIPPED | OUTPATIENT
Start: 2022-08-07 | End: 2022-08-17

## 2022-08-07 RX ORDER — TRIAMCINOLONE ACETONIDE 5 MG/G
CREAM TOPICAL
Qty: 45 G | Refills: 0 | Status: SHIPPED | OUTPATIENT
Start: 2022-08-07 | End: 2022-08-14

## 2022-08-07 RX ORDER — DIPHENHYDRAMINE HCL 25 MG
25 TABLET ORAL ONCE
Status: COMPLETED | OUTPATIENT
Start: 2022-08-07 | End: 2022-08-07

## 2022-08-07 RX ADMIN — DIPHENHYDRAMINE HCL 25 MG: 25 TABLET ORAL at 17:14

## 2022-08-07 RX ADMIN — DEXAMETHASONE SODIUM PHOSPHATE 10 MG: 10 INJECTION INTRAMUSCULAR; INTRAVENOUS at 17:14

## 2022-08-07 NOTE — ED PROVIDER NOTES
[address] [facility]  Department of Emergency Medicine   ED  Encounter Note  Admit Date/RoomTime: 2022  5:02 PM  ED Room: 33/    NAME: Kassie Roland  : 1965  MRN: [id number]     Chief Complaint:  Rash (poison ivy since fri.)    History of Present Illness       Kassie Roland is a 56 y.o. old male who presents to the emergency department by private vehicle, for pruritic rash of his upper arms and lower legs x2 days. Patient states him and his coworker was exposed to poison ivy prior to this starting. Patient states he tried over-the-counter topical medication with no improvement. Patient states his symptoms are mild in severity. Patient denies anything making it worse. Denies fever/chills, headache, vision change, dizziness, chest pain, dyspnea, abdominal pain, NVD, numbness/weakness. ROS   Pertinent positives and negatives are stated within HPI, all other systems reviewed and are negative. Past Medical History:  has no past medical history on file. Surgical History:  has no past surgical history on file. Social History:  reports that he has been smoking. He has been smoking an average of .5 packs per day. He has never used smokeless tobacco. He reports current drug use. Drug: Marijuana Candiss [place]). Family History: family history is not on file. Allergies: Penicillins    Physical Exam   Oxygen Saturation Interpretation: Normal.        ED Triage Vitals [22 1651]   BP Temp Temp Source Heart Rate Resp SpO2 Height Weight   (!) 184/85 97.5 °F (36.4 °C) Temporal 88 14 99 % 5' 5\" (1.651 m) 180 lb (81.6 kg)         Constitutional:  Alert, development consistent with age. HEENT:  NC/NT. Airway patent. Eyes:  PERRL, EOMI, no discharge. Ears:  TMs without perforation, injection, or bulging. External canals clear without exudate.   Mouth:  Mucous membranes moist without lesions, tongue and gums normal.  Throat:  Pharynx without injection, exudate, or tonsillar hypertrophy. Airway patient. Neck:  Supple. No lymphadenopathy. Respiratory:  Clear to auscultation and breath sounds equal.  CV:  Regular rate and rhythm. GI:  Abdomen Soft, nontender, +BS. Integument:  Skin turgor: Normal.            Papular rash of b/l forearms, b/l lower legs with serosanginous drainage and crusting c/w plant dermatitis. No erythema, warmth, streaking. Neurological:  Orientation age-appropriate unless noted elseware. Motor functions intact. Lab / Imaging Results   (All laboratory and radiology results have been personally reviewed by myself)  Labs:  No results found for this visit on 08/07/22. Imaging: All Radiology results interpreted by Radiologist unless otherwise noted. No orders to display       ED Course / Medical Decision Making     Medications   dexamethasone (DECADRON) injection 10 mg (10 mg IntraMUSCular Given 8/7/22 1714)   diphenhydrAMINE (BENADRYL) tablet 25 mg (25 mg Oral Given 8/7/22 1714)        Consults:   None    Procedures:   none    MDM:   Patient presenting with plan dermatitis. Patient is in no acute distress, afebrile, nontoxic appearance. Patient given a dose of Decadron and Benadryl in the ED. patient is a diabetic so I did not want to send him with a taper. Patient be sent with triamcinolone and Benadryl. Patient to follow-up with PCP. Recommend patient return the ED with new or worsening of symptoms. Plan of Care/Counseling:  BENITO Barakat reviewed today's visit with the patient in addition to providing specific details for the plan of care and counseling regarding the diagnosis and prognosis. Questions are answered at this time and are agreeable with the plan. Assessment      1. Plant dermatitis      Plan   Discharged home.   Patient condition is stable    New Medications     Discharge Medication List as of 8/7/2022  5:12 PM        START taking these medications    Details   triamcinolone (ARISTOCORT) 0.5 % cream Apply topically 3 times daily. , Disp-45 g, R-0, Normal      diphenhydrAMINE (BENADRYL) 25 MG capsule Take 1 capsule by mouth every 6 hours as needed for Itching, Disp-25 capsule, R-0Normal           Electronically signed by BENITO Barakat   DD: 8/7/22  **This report was transcribed using voice recognition software. Every effort was made to ensure accuracy; however, inadvertent computerized transcription errors may be present.   END OF ED PROVIDER NOTE      BENITO Barakat  08/07/22 2926

## 2023-03-18 ENCOUNTER — HOSPITAL ENCOUNTER (EMERGENCY)
Age: 58
Discharge: HOME | End: 2023-03-18
Payer: COMMERCIAL

## 2023-03-18 VITALS
RESPIRATION RATE: 16 BRPM | HEIGHT: 65 IN | HEART RATE: 84 BPM | DIASTOLIC BLOOD PRESSURE: 98 MMHG | OXYGEN SATURATION: 100 % | SYSTOLIC BLOOD PRESSURE: 158 MMHG | WEIGHT: 184 LBS | BODY MASS INDEX: 30.66 KG/M2 | TEMPERATURE: 98.1 F

## 2023-03-18 DIAGNOSIS — H66.90 ACUTE OTITIS MEDIA, UNSPECIFIED OTITIS MEDIA TYPE: Primary | ICD-10-CM

## 2023-03-18 DIAGNOSIS — H92.01 RIGHT EAR PAIN: ICD-10-CM

## 2023-03-18 PROCEDURE — 99283 EMERGENCY DEPT VISIT LOW MDM: CPT

## 2023-03-18 RX ORDER — DOXYCYCLINE HYCLATE 100 MG
100 TABLET ORAL 2 TIMES DAILY
Qty: 14 TABLET | Refills: 0 | Status: SHIPPED | OUTPATIENT
Start: 2023-03-18 | End: 2023-03-25

## 2023-03-18 RX ORDER — OFLOXACIN 3 MG/ML
5 SOLUTION AURICULAR (OTIC) 2 TIMES DAILY
Qty: 3.5 ML | Refills: 0 | Status: SHIPPED | OUTPATIENT
Start: 2023-03-18 | End: 2023-03-25

## 2023-03-18 ASSESSMENT — PAIN DESCRIPTION - DESCRIPTORS: DESCRIPTORS: SORE;PRESSURE;DISCOMFORT

## 2023-03-18 ASSESSMENT — PAIN - FUNCTIONAL ASSESSMENT: PAIN_FUNCTIONAL_ASSESSMENT: 0-10

## 2023-03-18 ASSESSMENT — PAIN DESCRIPTION - LOCATION: LOCATION: EAR

## 2023-03-18 ASSESSMENT — PAIN DESCRIPTION - ORIENTATION: ORIENTATION: RIGHT

## 2023-03-18 ASSESSMENT — PAIN DESCRIPTION - PAIN TYPE: TYPE: ACUTE PAIN

## 2023-03-18 ASSESSMENT — PAIN SCALES - GENERAL: PAINLEVEL_OUTOF10: 10

## 2023-03-18 ASSESSMENT — PAIN DESCRIPTION - FREQUENCY: FREQUENCY: CONTINUOUS

## 2023-03-18 NOTE — ED PROVIDER NOTES
Independent [facility] Visit. Department of Emergency Medicine   ED  Provider Note  Admit Date/RoomTime: 3/18/2023  9:25 AM  ED Room: 35/35    Chief Complaint:   Otalgia (Right ear. Feels full, hard to hear and painful)    History of Present Illness      Ana Cabello is a 56 y.o. old male who presents to the ED for right ear pain that has been ongoing for the past week. Patient states his ear feels full and he has decreased hearing. He denies any cough, congestion, headache, dizziness, neck pain, vision changes, or difficulty with ambulation or balance. He has no chest pain, shortness of breath, pain with breathing, abdominal pain, nausea, vomiting, or urinary complaints. Patient is alert and oriented x3 and in no apparent distress at this exam.  He is nontoxic-appearing. He has not seen his PCP for this problem yet. Patient is denying pain at this exam.    PCP: DO MOLINA Quijano   Pertinent positives and negatives are stated within HPI, all other systems reviewed and are negative. Past Medical History:  has no past medical history on file. Past Surgical History:  has no past surgical history on file. Social History:  reports that he has been smoking. He has been smoking an average of .5 packs per day. He has never used smokeless tobacco. He reports current drug use. Drug: Marijuana [place]). Family History: family history is not on file. The patients home medications have been reviewed. Allergies: Penicillins  Allergies have been reviewed with patient. Physical Exam   VS:  BP (!) 158/98   Pulse 84   Temp 98.1 °F (36.7 °C) (Oral)   Resp 16   Ht 5' 5\" (1.651 m)   Wt 184 lb (83.5 kg)   SpO2 100%   BMI 30.62 kg/m²      Oxygen Saturation Interpretation: Normal.    Constitutional:  Alert and oriented x3, development consistent with age.  NAD  Eyes: EOMI, non-injected conjunctiva   Ears:  External Ears: Bilateral normal.  No mastoid tenderness, Mild right postauricular lymphadenopathy              TM's & External Canals:  Right canal with redness and edema, yellow drainage, Left with cerumen, no redness or edema   Throat: mild erythema, uvula midline, Airway patent     Neck/Lymphatic: Supple. There is no cervical node tenderness. Non-tender with no meningeal signs   Respiratory:  Clear to auscultation and breath sounds equal, good air flow. No respiratory distress, unlabored breathing   CV: Regular rate and rhythm  Abdomen: Soft, non-tender, non-distended  Integument:  No rashes or erythema present. Neurological:  Motor functions intact. Lab / Imaging Results   (All laboratory and radiology results have been personally reviewed by myself)  Labs:  No results found for this visit on 03/18/23. Imaging: All Radiology results interpreted by Radiologist unless otherwise noted. No orders to display     -- MEDICAL DECISION MAKING --   History From: History from : Patient  Limitations to history : None    Record Review:  Outpatient Notes reviewed  Other Records Reviewed : None    CC/HPI Summary, DDx, ED Course, and Reassessment:   Patient presents to the ED for Otalgia (Right ear. Feels full, hard to hear and painful)    This is a 56year old male who presents to ED with right ear pain for the past week. Exam reveals possible otitis media. He will be sent home with Doxycycline and Ofloxacin drops due to sever PCN allergy (anaphylaxis per patient). Patient was given the following medications:  Medications - No data to display     Differential diagnoses included but not limited to otitis media, mastoiditis     Reassessment:  Patient continues to be non-toxic on re-evaluation. Chronic Conditions:   No past medical history on file. ED COURSE:      Any labs and imaging were reviewed by myself.      Consultations:  None  Discussion with Other Profesionals : None    COUNSELING:   I have spoken with the patient/caregiver and discussed todays results, in addition to providing specific details for the plan of care and counseling regarding the diagnosis and prognosis and are agreeable with the plan. All results reviewed with pt and all questions answered. I discussed the differential, results and discharge plan with the patient and/or family/friend/caregiver if present. I emphasized the importance of follow-up with the physician I referred them to in the timeframe recommended. I explained reasons for the patient to return to the Emergency Department. Additional verbal discharge instructions were also given and discussed with the patient to supplement those generated by the EMR. We also discussed medications that were prescribed (if any) including common side effects and interactions. All questions were addressed. They understand return precautions and discharge instructions. The patient and/or family/friend/caregiver expressed understanding. Vitals were stable and they were in no distress at discharge. Findings were discussed with the patient and reasons to immediately return to the ED were articulated to them. Patient will follow-up with their PMD.  DISPOSITION CONSIDERATIONS:    Disposition Considerations:  (include Tests not done, Shared Decision Making, Pt Expectation of Test or Tx.):   Appropriate for outpatient management        Social Determinants:  Social Determinants : None    MEDICATIONS:   DISCHARGE MEDICATIONS:  New Prescriptions    DOXYCYCLINE HYCLATE (VIBRA-TABS) 100 MG TABLET    Take 1 tablet by mouth 2 times daily for 7 days    OFLOXACIN (FLOXIN OTIC) 0.3 % OTIC SOLUTION    Place 5 drops into the right ear 2 times daily for 7 days TO AFFECTED EAR     DISCONTINUED MEDICATIONS:  Discontinued Medications    No medications on file     DIAGNOSIS:     1. Acute otitis media, unspecified otitis media type    2.  Right ear pain      This patient's ED course included: a personal history and physicial examination  This patient has remained hemodynamically stable during their ED course. DISPOSITION:   Discharge to home. Patient condition is good. Discharge Instructions:   Patient referred to  [facility] [address] [place] [address] [phone]    Call   MONDAY AM, for follow-up on ED visit    Dionne Winkler MD  [facility] [address] [place] [address] [phone]    Call in 1 week  as needed, if symptoms worsen    Electronically signed by Lidia Kim PA-C   DD: 3/18/23  I am the Primary Clinician of Record. **This report was transcribed using voice recognition software. Every effort was made to ensure accuracy; however, inadvertent computerized transcription errors may be present.     END OF PROVIDER NOTE       Lidia Kim PA-C  03/18/23 4477

## 2023-05-04 ENCOUNTER — TELEPHONE (OUTPATIENT)
Dept: ENT CLINIC | Age: 58
End: 2023-05-04

## 2023-05-04 NOTE — TELEPHONE ENCOUNTER
Attempted to contact patient to reschedule no show appointment; I was informed that the number listed for patient was no longer his phone number. I removed phone number from patients chart. No emergency contacts to call  to obtain new phone number.      Electronically signed by Patricia Taveras on 5/4/2023 at 10:21 AM

## 2024-07-25 ENCOUNTER — HOSPITAL ENCOUNTER (EMERGENCY)
Age: 59
Discharge: HOME | End: 2024-07-25
Attending: EMERGENCY MEDICINE
Payer: COMMERCIAL

## 2024-07-25 VITALS
OXYGEN SATURATION: 100 % | RESPIRATION RATE: 16 BRPM | BODY MASS INDEX: 27.32 KG/M2 | TEMPERATURE: 97.9 F | WEIGHT: 164 LBS | HEIGHT: 65 IN | DIASTOLIC BLOOD PRESSURE: 104 MMHG | SYSTOLIC BLOOD PRESSURE: 167 MMHG | HEART RATE: 83 BPM

## 2024-07-25 DIAGNOSIS — L23.7 POISON IVY DERMATITIS: Primary | ICD-10-CM

## 2024-07-25 PROCEDURE — 96372 THER/PROPH/DIAG INJ SC/IM: CPT

## 2024-07-25 PROCEDURE — 99284 EMERGENCY DEPT VISIT MOD MDM: CPT

## 2024-07-25 PROCEDURE — 6360000002 HC RX W HCPCS: Performed by: EMERGENCY MEDICINE

## 2024-07-25 RX ORDER — TRIAMCINOLONE ACETONIDE 5 MG/G
CREAM TOPICAL
Qty: 1 EACH | Refills: 0 | Status: SHIPPED | OUTPATIENT
Start: 2024-07-25 | End: 2024-07-25

## 2024-07-25 RX ORDER — TRIAMCINOLONE ACETONIDE 5 MG/G
CREAM TOPICAL
Qty: 1 EACH | Refills: 0 | Status: SHIPPED | OUTPATIENT
Start: 2024-07-25 | End: 2024-08-01

## 2024-07-25 RX ORDER — TRIAMCINOLONE ACETONIDE 40 MG/ML
40 INJECTION, SUSPENSION INTRA-ARTICULAR; INTRAMUSCULAR ONCE
Status: COMPLETED | OUTPATIENT
Start: 2024-07-25 | End: 2024-07-25

## 2024-07-25 RX ADMIN — TRIAMCINOLONE ACETONIDE 40 MG: 40 INJECTION, SUSPENSION INTRA-ARTICULAR; INTRAMUSCULAR at 07:32

## 2024-07-25 NOTE — DISCHARGE INSTR - COC
Continuity of Care Form    Patient Name: Jamey Anderson   :  1965  MRN:  [id number]    Admit date:  2024  Discharge date:  ***    Code Status Order: No Order   Advance Directives:     Admitting Physician:  No admitting provider for patient encounter.  PCP: Hang Macias DO    Discharging Nurse: ***  Discharging Hospital Unit/Room#: 34/34  Discharging Unit Phone Number: ***    Emergency Contact:   No emergency contact information on file.    Past Surgical History:  History reviewed. No pertinent surgical history.    Immunization History:     There is no immunization history on file for this patient.    Active Problems:  There is no problem list on file for this patient.      Isolation/Infection:   Isolation            No Isolation          Patient Infection Status       None to display            Nurse Assessment:  Last Vital Signs: BP (!) 167/104   Pulse 83   Temp 97.9 °F (36.6 °C) (Oral)   Resp 16   Ht 1.651 m (5' 5\")   Wt 74.4 kg (164 lb)   SpO2 100%   BMI 27.29 kg/m²     Last documented pain score (0-10 scale):    Last Weight:   Wt Readings from Last 1 Encounters:   24 74.4 kg (164 lb)     Mental Status:  {IP PT MENTAL STATUS:}    IV Access:  { ANTONIA IV ACCESS:150793590}    Nursing Mobility/ADLs:  Walking   {CHP DME ADLs:946593994}  Transfer  {CHP DME ADLs:463425330}  Bathing  {CHP DME ADLs:885871931}  Dressing  {CHP DME ADLs:956472988}  Toileting  {CHP DME ADLs:274486268}  Feeding  {CHP DME ADLs:624301383}  Med Admin  {CHP DME ADLs:588850711}  Med Delivery   { ANTONIA MED Delivery:670901271}    Wound Care Documentation and Therapy:        Elimination:  Continence:   Bowel: {YES / NO:}  Bladder: {YES / NO:}  Urinary Catheter: {Urinary Catheter:664440169}   Colostomy/Ileostomy/Ileal Conduit: {YES / NO:}       Date of Last BM: ***  No intake or output data in the 24 hours ending 24 0749  No intake/output data recorded.    Safety Concerns:     { ANTONIA Safety

## 2024-07-25 NOTE — ED NOTES
Patient presents to ED with an itchy rash to his bilateral legs that is spreading to his right arm.  Symptoms began after working in the yard a few days ago.  Thinks he may have gotten into poison ivy.  Symptoms have not been relieved with OTC hydrocortisone cream, has not taken any benadryl.

## 2024-07-25 NOTE — ED PROVIDER NOTES
HPI:  7/25/24, Time: 7:32 AM EDT         Jamey Anderson is a 59 y.o. male presenting to the ED for rash beginning about a week ago after working outside.  Patient is concerned he has poison ivy.  He has been using hydrocortisone cream without relief.  He denies any lesions to his mouth or genitals.  Denies any fevers.  Rash is itchy.  No new medications.  No tongue or lip swelling    --------------------------------------------- PAST HISTORY ---------------------------------------------  Past Medical History:  has no past medical history on file.    Past Surgical History:  has no past surgical history on file.    Social History:  reports that he has been smoking. He has never used smokeless tobacco. He reports current drug use. Drug: Marijuana (Weed).    Family History: family history is not on file.     The patient’s home medications have been reviewed.    Allergies: Penicillins    -------------------------------------------------- RESULTS -------------------------------------------------  All laboratory and radiology results have been personally reviewed by myself   LABS:  No results found for this visit on 07/25/24.    RADIOLOGY:  Interpreted by Radiologist.  No orders to display       ------------------------- NURSING NOTES AND VITALS REVIEWED ---------------------------   The nursing notes within the ED encounter and vital signs as below have been reviewed.   BP (!) 167/104   Pulse 83   Temp 97.9 °F (36.6 °C) (Oral)   Resp 16   Ht 1.651 m (5' 5\")   Wt 74.4 kg (164 lb)   SpO2 100%   BMI 27.29 kg/m²   Oxygen Saturation Interpretation: Normal      ---------------------------------------------------PHYSICAL EXAM--------------------------------------      Constitutional/General: Alert and oriented x3, well appearing, non toxic in NAD  Head: Normocephalic and atraumatic  Mouth: Oropharynx clear, handling secretions, no trismus. No mucosal lesions.  Neck: Supple, full ROM,   Abdomen: Soft, non tender, non

## 2024-11-04 ENCOUNTER — OFFICE VISIT (OUTPATIENT)
Dept: ENT CLINIC | Age: 59
End: 2024-11-04
Payer: COMMERCIAL

## 2024-11-04 VITALS
BODY MASS INDEX: 28.82 KG/M2 | SYSTOLIC BLOOD PRESSURE: 138 MMHG | DIASTOLIC BLOOD PRESSURE: 79 MMHG | WEIGHT: 173 LBS | HEART RATE: 79 BPM | HEIGHT: 65 IN

## 2024-11-04 DIAGNOSIS — L40.9 PSORIASIS: ICD-10-CM

## 2024-11-04 DIAGNOSIS — H61.23 BILATERAL IMPACTED CERUMEN: Primary | ICD-10-CM

## 2024-11-04 DIAGNOSIS — H92.03 OTALGIA OF BOTH EARS: ICD-10-CM

## 2024-11-04 PROCEDURE — 99203 OFFICE O/P NEW LOW 30 MIN: CPT | Performed by: OTOLARYNGOLOGY

## 2024-11-04 PROCEDURE — 69210 REMOVE IMPACTED EAR WAX UNI: CPT | Performed by: OTOLARYNGOLOGY

## 2024-11-04 RX ORDER — ATORVASTATIN CALCIUM 10 MG/1
10 TABLET, FILM COATED ORAL NIGHTLY
COMMUNITY

## 2024-11-04 RX ORDER — AMLODIPINE BESYLATE 10 MG/1
10 TABLET ORAL DAILY
COMMUNITY
Start: 2024-07-29

## 2024-11-04 ASSESSMENT — ENCOUNTER SYMPTOMS
WHEEZING: 0
SINUS PRESSURE: 0
SINUS PAIN: 0
CHOKING: 0
STRIDOR: 0
SORE THROAT: 0
SHORTNESS OF BREATH: 0
COUGH: 0
RHINORRHEA: 0

## 2024-11-04 NOTE — PROGRESS NOTES
using soft plastic curette. Tympanic membranes are intact following the procedure. Auditory canals appear normal.        Assessment:       Diagnosis Orders   1. Bilateral impacted cerumen  REMOVE IMPACTED EAR WAX      2. Psoriasis        3. Otalgia of both ears                Plan:        59-year-old male with itchy ears and cerumen.  On exam he had significant cerumen impaction bilaterally with dry flaking skin of the canal and chondral bowl.  Cerumen removed without difficulty today.  Discussed he likely has some psoriatic changes to the ear, we will first treat with sweet oil/baby oil and if symptoms persist will give triamcinolone cream at next visit    Follow-up in 3 months for ear reevaluation        This note may be dictated with vocal recognition software. All grammatical or semantic errors should be considered accordingly.     Electronically signed by Vahe Rabago DO on 11/4/24 at4:46 PM EST

## 2024-12-05 ENCOUNTER — HOSPITAL ENCOUNTER (OUTPATIENT)
Age: 59
Discharge: HOME | End: 2024-12-07

## 2024-12-10 LAB — SURGICAL PATHOLOGY REPORT: NORMAL

## 2025-01-30 ENCOUNTER — HOSPITAL ENCOUNTER (OUTPATIENT)
Age: 60
Discharge: HOME | End: 2025-01-30
Payer: COMMERCIAL

## 2025-01-30 LAB
ANION GAP SERPL CALCULATED.3IONS-SCNC: 13 MMOL/L (ref 7–16)
BASOPHILS # BLD: 0.05 K/UL (ref 0–0.2)
BASOPHILS NFR BLD: 1 % (ref 0–2)
BUN SERPL-MCNC: 15 MG/DL (ref 6–20)
CALCIUM SERPL-MCNC: 8.8 MG/DL (ref 8.6–10.2)
CHLORIDE SERPL-SCNC: 106 MMOL/L (ref 98–107)
CO2 SERPL-SCNC: 23 MMOL/L (ref 22–29)
CREAT SERPL-MCNC: 1.4 MG/DL (ref 0.7–1.2)
EOSINOPHIL # BLD: 0.06 K/UL (ref 0.05–0.5)
EOSINOPHILS RELATIVE PERCENT: 1 % (ref 0–6)
ERYTHROCYTE [DISTWIDTH] IN BLOOD BY AUTOMATED COUNT: 13.2 % (ref 11.5–15)
GFR, ESTIMATED: 57 ML/MIN/1.73M2
GLUCOSE SERPL-MCNC: 136 MG/DL (ref 74–99)
HCT VFR BLD AUTO: 45.5 % (ref 37–54)
HGB BLD-MCNC: 14.6 G/DL (ref 12.5–16.5)
IMM GRANULOCYTES # BLD AUTO: <0.03 K/UL (ref 0–0.58)
IMM GRANULOCYTES NFR BLD: 0 % (ref 0–5)
LYMPHOCYTES NFR BLD: 1.93 K/UL (ref 1.5–4)
LYMPHOCYTES RELATIVE PERCENT: 32 % (ref 20–42)
MAGNESIUM SERPL-MCNC: 2.4 MG/DL (ref 1.6–2.6)
MCH RBC QN AUTO: 30.3 PG (ref 26–35)
MCHC RBC AUTO-ENTMCNC: 32.1 G/DL (ref 32–34.5)
MCV RBC AUTO: 94.4 FL (ref 80–99.9)
MONOCYTES NFR BLD: 0.5 K/UL (ref 0.1–0.95)
MONOCYTES NFR BLD: 8 % (ref 2–12)
NEUTROPHILS NFR BLD: 58 % (ref 43–80)
NEUTS SEG NFR BLD: 3.53 K/UL (ref 1.8–7.3)
PHOSPHATE SERPL-MCNC: 2.8 MG/DL (ref 2.5–4.5)
PLATELET # BLD AUTO: 296 K/UL (ref 130–450)
PMV BLD AUTO: 8.8 FL (ref 7–12)
POTASSIUM SERPL-SCNC: 4.2 MMOL/L (ref 3.5–5)
RBC # BLD AUTO: 4.82 M/UL (ref 3.8–5.8)
SODIUM SERPL-SCNC: 142 MMOL/L (ref 132–146)
WBC OTHER # BLD: 6.1 K/UL (ref 4.5–11.5)

## 2025-01-30 PROCEDURE — 85025 COMPLETE CBC W/AUTO DIFF WBC: CPT

## 2025-01-30 PROCEDURE — 83735 ASSAY OF MAGNESIUM: CPT

## 2025-01-30 PROCEDURE — 84100 ASSAY OF PHOSPHORUS: CPT

## 2025-01-30 PROCEDURE — 36415 COLL VENOUS BLD VENIPUNCTURE: CPT

## 2025-01-30 PROCEDURE — 80048 BASIC METABOLIC PNL TOTAL CA: CPT

## 2025-03-21 ENCOUNTER — HOSPITAL ENCOUNTER (EMERGENCY)
Age: 60
Discharge: HOME | End: 2025-03-21
Payer: COMMERCIAL

## 2025-03-21 VITALS
RESPIRATION RATE: 14 BRPM | HEIGHT: 65 IN | SYSTOLIC BLOOD PRESSURE: 129 MMHG | DIASTOLIC BLOOD PRESSURE: 74 MMHG | TEMPERATURE: 98.1 F | OXYGEN SATURATION: 100 % | WEIGHT: 166 LBS | BODY MASS INDEX: 27.66 KG/M2 | HEART RATE: 90 BPM

## 2025-03-21 DIAGNOSIS — H61.22 IMPACTED CERUMEN OF LEFT EAR: ICD-10-CM

## 2025-03-21 DIAGNOSIS — H65.92 LEFT NON-SUPPURATIVE OTITIS MEDIA: Primary | ICD-10-CM

## 2025-03-21 PROCEDURE — 6370000000 HC RX 637 (ALT 250 FOR IP): Performed by: NURSE PRACTITIONER

## 2025-03-21 PROCEDURE — 99283 EMERGENCY DEPT VISIT LOW MDM: CPT

## 2025-03-21 RX ORDER — DOXYCYCLINE HYCLATE 100 MG
100 TABLET ORAL 2 TIMES DAILY
Qty: 14 TABLET | Refills: 0 | Status: SHIPPED | OUTPATIENT
Start: 2025-03-21 | End: 2025-03-28

## 2025-03-21 RX ADMIN — CARBAMIDE PEROXIDE 6.5% 5 DROP: 6.5 LIQUID AURICULAR (OTIC) at 10:35

## 2025-03-21 ASSESSMENT — PAIN DESCRIPTION - LOCATION: LOCATION: EAR

## 2025-03-21 ASSESSMENT — PAIN - FUNCTIONAL ASSESSMENT: PAIN_FUNCTIONAL_ASSESSMENT: 0-10

## 2025-03-21 ASSESSMENT — PAIN SCALES - GENERAL: PAINLEVEL_OUTOF10: 1

## 2025-03-21 ASSESSMENT — PAIN DESCRIPTION - ORIENTATION: ORIENTATION: LEFT

## 2025-03-21 ASSESSMENT — LIFESTYLE VARIABLES
HOW OFTEN DO YOU HAVE A DRINK CONTAINING ALCOHOL: NEVER
HOW MANY STANDARD DRINKS CONTAINING ALCOHOL DO YOU HAVE ON A TYPICAL DAY: PATIENT DOES NOT DRINK

## 2025-03-21 ASSESSMENT — PAIN DESCRIPTION - DESCRIPTORS: DESCRIPTORS: THROBBING

## 2025-03-22 NOTE — ED PROVIDER NOTES
Independent DANIELLA Visit.          [facility] EMERGENCY DEPARTMENT  EMERGENCY DEPARTMENT ENCOUNTER        Pt Name: Jamey Anderson  MRN: [id number]  Birthdate 1965  Date of evaluation: 3/21/2025  Provider: ANKUSH Pope - CNP  PCP: Hang Macias DO      CHIEF COMPLAINT       Chief Complaint   Patient presents with    Ear Pain     Pt complaining of left ear pain/throbbing for approximately 2 weeks. Has attempted multiple OTC flush/cleaning kits.        HISTORY OF PRESENT ILLNESS: 1 or more Elements   History From: Patient and patient's medical record      Jamey Anderson is a 59 y.o. male who presents to the emergency department for evaluation of left ear pain.  Patient states he is had pain and throbbing sensation for about the last 2 weeks.  He states he has a history of cerumen impaction chronically.  He states that he has tried over-the-counter irrigation kits which have not seemed to help.  This is what prompted his ED visit.  He denies any direct injury or trauma.  Denies any headache, dizziness, tinnitus, lightheadedness, syncope, near syncope, ear bleeding or drainage, hearing loss, neck pain, cough, congestion, fever, chills, pharyngitis, CP, SOB, abdominal pain, NVD.  Denies any recent antibiotic use.  Tolerating p.o. fluids and foods.  Denies any known exposure to anyone who is sick.  States he has followed with ENT in the past for this problem.    Nursing Notes were all reviewed and agreed with or any disagreements were addressed in the HPI.      REVIEW OF EXTERNAL NOTE :    11/4/2024 ENT office note reviewed      REVIEW OF SYSTEMS :           Positives and Pertinent negatives as per HPI.     SURGICAL HISTORY   No past surgical history on file.    CURRENTMEDICATIONS       Discharge Medication List as of 3/21/2025 11:16 AM        CONTINUE these medications which have NOT CHANGED    Details   amLODIPine (NORVASC) 10 MG tablet Take 1 tablet by mouth dailyHistorical Med     ROSANGELA, DO  [address] [facility] [address]  [phone]    Call   physician follow up with your ENT      DISCHARGE MEDICATIONS:  Discharge Medication List as of 3/21/2025 11:16 AM        START taking these medications    Details   doxycycline hyclate (VIBRA-TABS) 100 MG tablet Take 1 tablet by mouth 2 times daily for 7 days, Disp-14 tablet, R-0Normal             DISCONTINUED MEDICATIONS:  Discharge Medication List as of 3/21/2025 11:16 AM                 (Please note that portions of this note were completed with a voice recognition program.  Efforts were made to edit the dictations but occasionally words are mis-transcribed.)    ANKUSH Pope CNP (electronically signed)         Jerri Yanez APRN - CNP  03/22/25 0531

## 2025-04-25 ENCOUNTER — TELEPHONE (OUTPATIENT)
Dept: ENT CLINIC | Age: 60
End: 2025-04-25

## 2025-04-25 NOTE — TELEPHONE ENCOUNTER
Patient called requesting an appointment to get his ears cleaned, states he has constant itching and drainage from his left ear. Please advise.

## 2025-06-23 ENCOUNTER — OFFICE VISIT (OUTPATIENT)
Dept: ENT CLINIC | Age: 60
End: 2025-06-23
Payer: COMMERCIAL

## 2025-06-23 VITALS
SYSTOLIC BLOOD PRESSURE: 131 MMHG | OXYGEN SATURATION: 97 % | WEIGHT: 166 LBS | BODY MASS INDEX: 27.66 KG/M2 | HEIGHT: 65 IN | DIASTOLIC BLOOD PRESSURE: 80 MMHG | TEMPERATURE: 98.6 F | HEART RATE: 74 BPM

## 2025-06-23 DIAGNOSIS — H61.23 BILATERAL IMPACTED CERUMEN: Primary | ICD-10-CM

## 2025-06-23 DIAGNOSIS — L40.9 PSORIASIS: ICD-10-CM

## 2025-06-23 DIAGNOSIS — H92.03 OTALGIA OF BOTH EARS: ICD-10-CM

## 2025-06-23 PROCEDURE — 99213 OFFICE O/P EST LOW 20 MIN: CPT | Performed by: OTOLARYNGOLOGY

## 2025-06-23 PROCEDURE — 4004F PT TOBACCO SCREEN RCVD TLK: CPT | Performed by: OTOLARYNGOLOGY

## 2025-06-23 PROCEDURE — G8419 CALC BMI OUT NRM PARAM NOF/U: HCPCS | Performed by: OTOLARYNGOLOGY

## 2025-06-23 PROCEDURE — 69210 REMOVE IMPACTED EAR WAX UNI: CPT | Performed by: OTOLARYNGOLOGY

## 2025-06-23 PROCEDURE — G8427 DOCREV CUR MEDS BY ELIG CLIN: HCPCS | Performed by: OTOLARYNGOLOGY

## 2025-06-23 PROCEDURE — 3017F COLORECTAL CA SCREEN DOC REV: CPT | Performed by: OTOLARYNGOLOGY

## 2025-06-23 RX ORDER — TRIAMCINOLONE ACETONIDE 0.25 MG/G
OINTMENT TOPICAL
Qty: 454 G | Refills: 1 | Status: SHIPPED | OUTPATIENT
Start: 2025-06-23 | End: 2025-06-30

## 2025-06-23 RX ORDER — TRIAMCINOLONE ACETONIDE 0.25 MG/G
OINTMENT TOPICAL
Qty: 454 G | Refills: 1 | Status: SHIPPED | OUTPATIENT
Start: 2025-06-23 | End: 2025-06-23

## 2025-06-23 ASSESSMENT — ENCOUNTER SYMPTOMS
SINUS PRESSURE: 0
COUGH: 0
STRIDOR: 0
WHEEZING: 0
SINUS PAIN: 0
CHOKING: 0
SORE THROAT: 0
RHINORRHEA: 0
SHORTNESS OF BREATH: 0

## 2025-06-23 NOTE — PROGRESS NOTES
Department of Otolaryngology  Office Consult Note        Subjective:        Chief Complaint:  had concerns including Follow-up (Left ear impacted cerumen itchy ears ).     Patient ID: Jamey Anderson is a 59 y.o. male.    HPI: Patient presents as  follow-up for itchy ears and cerumen.    Was doing well but feels left ear is itching and feels full of wax  He has been using ear drop from PCP     History:  Patient reports several month history of feeling his ears itching and like there is something stuck in both of his ears.  These are associated with muffled hearing.  Denies drainage from the ears.  Denies previous history of ear infections, cerumen impaction, psoriasis, hearing loss.    Review of Systems   Constitutional: Negative.    HENT:  Positive for ear pain. Negative for congestion, ear discharge, hearing loss, rhinorrhea, sinus pressure, sinus pain, sore throat and tinnitus.    Respiratory:  Negative for cough, choking, shortness of breath, wheezing and stridor.    Cardiovascular:  Negative for chest pain.   Skin:  Negative for rash.   Allergic/Immunologic: Negative for environmental allergies and immunocompromised state.   Neurological:  Negative for dizziness, weakness, light-headedness and headaches.   Psychiatric/Behavioral:  Negative for confusion.    All other systems reviewed and are negative.        History reviewed. No pertinent past medical history.  History reviewed. No pertinent surgical history.    Current Outpatient Medications:     triamcinolone (KENALOG) 0.025 % ointment, Apply topically 2 times daily., Disp: 454 g, Rfl: 1    amLODIPine (NORVASC) 10 MG tablet, Take 1 tablet by mouth daily, Disp: , Rfl:     atorvastatin (LIPITOR) 10 MG tablet, Take 1 tablet by mouth nightly at bedtime, Disp: , Rfl:     ondansetron (ZOFRAN ODT) 4 MG disintegrating tablet, Take 1 tablet by mouth every 8 hours as needed for Nausea or Vomiting (Patient not taking: Reported on 11/4/2024), Disp: 24 tablet, Rfl: 0

## 2025-08-18 ENCOUNTER — HOSPITAL ENCOUNTER (EMERGENCY)
Age: 60
Discharge: HOME | End: 2025-08-18
Payer: COMMERCIAL

## 2025-08-18 VITALS
HEIGHT: 65 IN | OXYGEN SATURATION: 97 % | SYSTOLIC BLOOD PRESSURE: 141 MMHG | HEART RATE: 84 BPM | WEIGHT: 164 LBS | BODY MASS INDEX: 27.32 KG/M2 | TEMPERATURE: 98.6 F | RESPIRATION RATE: 18 BRPM | DIASTOLIC BLOOD PRESSURE: 80 MMHG

## 2025-08-18 DIAGNOSIS — L23.7 POISON IVY DERMATITIS: Primary | ICD-10-CM

## 2025-08-18 PROCEDURE — 99283 EMERGENCY DEPT VISIT LOW MDM: CPT

## 2025-08-18 PROCEDURE — 6370000000 HC RX 637 (ALT 250 FOR IP): Performed by: PHYSICIAN ASSISTANT

## 2025-08-18 PROCEDURE — 6360000002 HC RX W HCPCS: Performed by: PHYSICIAN ASSISTANT

## 2025-08-18 RX ORDER — CAMPHOR 0.45 %
1 GEL (GRAM) TOPICAL 3 TIMES DAILY
Qty: 14 ML | Refills: 0 | Status: SHIPPED | OUTPATIENT
Start: 2025-08-18

## 2025-08-18 RX ORDER — TRIAMCINOLONE ACETONIDE 1 MG/G
CREAM TOPICAL
Qty: 45 G | Refills: 0 | Status: SHIPPED | OUTPATIENT
Start: 2025-08-18

## 2025-08-18 RX ORDER — DEXAMETHASONE SODIUM PHOSPHATE 10 MG/ML
10 INJECTION, SOLUTION INTRA-ARTICULAR; INTRALESIONAL; INTRAMUSCULAR; INTRAVENOUS; SOFT TISSUE ONCE
Status: COMPLETED | OUTPATIENT
Start: 2025-08-18 | End: 2025-08-18

## 2025-08-18 RX ORDER — DIPHENHYDRAMINE HCL 25 MG
25 TABLET ORAL ONCE
Status: COMPLETED | OUTPATIENT
Start: 2025-08-18 | End: 2025-08-18

## 2025-08-18 RX ORDER — PREDNISONE 20 MG/1
40 TABLET ORAL DAILY
Qty: 10 TABLET | Refills: 0 | Status: SHIPPED | OUTPATIENT
Start: 2025-08-18 | End: 2025-08-23

## 2025-08-18 RX ADMIN — DEXAMETHASONE SODIUM PHOSPHATE 10 MG: 10 INJECTION INTRAMUSCULAR; INTRAVENOUS at 12:28

## 2025-08-18 RX ADMIN — DIPHENHYDRAMINE HYDROCHLORIDE 25 MG: 25 TABLET ORAL at 12:28
